# Patient Record
(demographics unavailable — no encounter records)

---

## 2024-12-31 NOTE — HISTORY OF PRESENT ILLNESS
[FreeTextEntry1] : Ms. Thompson presents to the office with reports of anorectal pain over the last 6-7 months. She admits to inconsistent BMs that requires straining at times to evacuate. There is associated burning and knifelike pain and very occasional bleeding noted.  11/16/20  returns to the office for follow-up.  Since her last office visit 2 years earlier, she reports interval improvement until recently.  She now is experiencing similar anal rectal pain with associated muscle spasms.  She believes her diet is rich enough in fiber, but she admits to inadequate daily water intake.  She has been using MiraLAX, though after missing several doses, she became somewhat constipated with then resultant anal rectal fissures and pain.  12/7/20 Ms. Thompson returns to the office for follow-up. Since her last office visit, she reports resolution of anorectal pain and discomfort after 1 week of using the prescribed hydrocortisone and diltiazem creams. In addition, she has identified the culprit of her constipation over the last 3 years, namely the medication, propanolol. Her primary care physician has since discontinued this medication and replaced it with an alternative antihypertensive. As a result, she is no longer constipated and is no longer suffering symptoms of an anal fissure. She continues to be anxious regarding the possibility of recurrent symptoms. She is scheduled for surgery the end of December but is not ready to cancel surgery.  2/16/21 Ms. Thompson returns to the office, accompanied by her , for follow-up of her anal fissure. She continues to have intermittent episodes of pain and discomfort from her anal fissure.  This time, there was noted to be blood on the toilet paper and some also on the stool.  Despite prior attempts for surgical scheduling, she has routinely canceled because of perceived clinical improvement. At this juncture, however, she is more clearly convinced of requiring surgical intervention.  She also desires to be less reliant on MiraLAX, however, admits that her issues with constipation likely stems from stress and IBS.  3/17/21 Ms. Thompson presents to the office for a POV after undergoing fissurectomy, sphincterotomy and excision of anal skin tags on 3/2/21.  She reports feeling well overall.  Bowel movements are passed daily or twice daily with some anorectal pain and discomfort, but of a different character and quality as the anal fissure pain.  She admits that the incisional discomfort is gradually improving.  There is no significant drainage or bleeding noted.  4/29/21 Ms Thompson presents to the office for follow-up.  She denies any sharp stabbing pain after bowel movements but now notes that she has difficulties with defecation.  She has been trying to adhere to a good bowel regimen, but despite this, has to increasingly strain to evacuate.  There is then throbbing of the pelvic floor for several hours afterwards that is alleviated by rest and a warm compress.  She just began taking Benefiber, is drinking ample amounts of water and continues to use MiraLAX.  11/17/21 Ms. Thompson returns to the office for follow-up.  Despite undergoing fissurectomy and sphincterotomy in March of this year, she reports occasionally still having difficulties with evacuating her stools with resultant sharp stabbing pain.  There are days when she has no difficulties with evacuating her stools.  There is no rectal bleeding noted.  She has not pursued pelvic floor physical therapy as recommended in the past.  3/16/22 Ms. Thompson returns to the office for follow-up.  It has been over a year since she underwent fissurectomy and sphincterotomy for her anal fissure.  She was last seen in office in November 2021 with what appeared to be pelvic floor spasms and a nonrelaxing puborectalis.  Recommendations were made for pelvic floor PT which she was not able to follow through on secondary to Covid infections within her family.  In addition she had been prescribed diazepam suppositories but did not receive them as pharmacy had stated the cost was not covered by her insurance.  Overall, she has been doing relatively well and recently had endometrial polyps removed and placement of an IUD.  She did not have any issues immediately postop, but over the last 2 to 3 days, has been experiencing significant pelvic pain.  She attributes this pain to difficulties evacuating her stools. As she expects there to be rectal pain immediately on evacuation, she clenches her pelvic floor in anticipation of the pain which then makes further defecation difficult.  She notes that she is on iron which may be contributing to constipation issues though continues to use MiraLAX daily which does not seem to be of any benefit.  2/2/23 Ms. Thompson returns to the office for followup.  She reports similar issues in the past including inconsistent bowel regimen with resultant straining as well as rectal pain.  This recently recurred over the last few weeks though was gradually alleviated with the help of MiraLAX, Colace as well as hemorrhoidal creams.  She is here for further evaluation and treatment options.  12/31/24 Jackeline returns to the office for followup. She reports that 1 week earlier, around the time of her menstruation, she developed severe constipation, worse constipation than her usual baseline.  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  She began taking MiraLAX and symptoms of anoectal pain have improved, but have not completely resolved.  She denies any rectal bleeding.  Here now for follow-up.

## 2024-12-31 NOTE — PHYSICAL EXAM
[Normal rectal exam] : exam was normal [Reduce Spontaneously] : a spontaneously reducible (grade II) [Normal] : was normal [None] : there was no rectal mass  [No Rash or Lesion] : No rash or lesion [Alert] : alert [Oriented to Person] : oriented to person [Oriented to Place] : oriented to place [Oriented to Time] : oriented to time [Calm] : calm [Anterior] : anteriorly [Skin Tags] : there were no residual hemorrhoidal skin tags seen [Gross Blood] : no gross blood [de-identified] : small anterior [de-identified] : No apparent distress [de-identified] : Normocephalic atraumatic [de-identified] : Moving all extremities x4

## 2024-12-31 NOTE — REASON FOR VISIT
[Follow-Up: _____] : a [unfilled] follow-up visit [FreeTextEntry1] : 3/2/21 Fissurectomy, sphincterotomy, excision skin tags

## 2024-12-31 NOTE — ASSESSMENT
[FreeTextEntry1] : Ms. Thompson presents to the office with signs/symptoms of an anal fissure. The cause of anal fissures, including hard and traumatic stools as well as diarrheal stools were discussed. Conservative management with the usage of creams, including diltiazem cream 2% t.i.d. and Analpram cream 2.5% t.i.d., to the fissure site for 4-6 weeks will be attempted in order to allow for healing without surgical intervention. In order to prevent worsening of the fissure symptoms, and/or recurrence, the following daily dietary recommendations were made : high fiber (25-30 g ), water 80 ounces, +/- MiraLax daily.  Sitz baths should also be after bowel movements and for comfort. If compliant with the above, over 80% of patients will heal with these measures . Improvement of symptoms can be seen 2 weeks after initiation of treatment. If the patient continues to have symptoms despite strict compliance to the above, a return visit will need to be scheduled. At that time, the need for sphincterotomy and fissurectomy will be reviewed and discussed.   12/7/20 Ms Thompson returns to the office for follow-up of her anal fissures. She reports that she became symptomatic 1 week after seeing me in office. In addition, she will had her antihypertensives changed so that the side effect of constipation from one of her medications could be avoided. Overall, she is improved in the sense that the constipating agent has been removed and symptomatically, she is pain-free. At this juncture, I have recommended that she cancel planned surgery at the end of the month, but she continues to be fearful of recurrent anorectal pain and discomfort. We have agreed for her to wait and see how symptoms are over the next 2 weeks before definitively canceling surgery. She was reassured that if there is future recurrence of anal fissure pain and discomfort, she could always resume using the prescribed creams. 11/16/20 Ms. Thompson returns to the office for consultation, accompanied by her .  Since her last office visit, she had improvement in an anterior anal fissure.  Recently, symptoms have returned, and on physical exam, it appears that not only does she have an anterior anal fissure, but she has since developed a posterior one.  We discussed the role of constipation and hard stools and causing these fissures.  Recommendations for a high-fiber diet with ample water intake and MiraLAX nightly were once again reviewed.  She is interested in surgical intervention and I have discussed with her the risks/benefits/alternatives for fissurectomy and sphincterotomy.  She would like to have this scheduled at [facility], but in the interim, we will renew her creams for hydrocortisone and diltiazem as well as prescribe a small dose of Valium to help break her muscle spasms.  She understands, and is agreeable.  2/16/21 Ms Thompson returns to the office with worsening issues from progression of her anal fissures.  Given this finding, I have once again recommended surgical intervention in the form of fissurectomy, sphincterotomy and excision of external hemorrhoids. The R/B/A were reviewed once again, as well as duration of procedure, postop care and pain to be expected. She would like to proceed with scheduling.  3/17/21 Ms. Thompson presents to the office for a postoperative visit.  Overall, she is doing well, and her anorectal wounds are healing appropriately.  Silver nitrate was applied to facilitate epithelialization.  She was reassured that over the following 4 to 6 weeks, the mild residual swelling will resolve itself as well the pruritus associated with a healing wound.  All questions were answered to patient satisfaction.  4/29/21 Ms. Thompson presents to the office for a POV. Since her last office visit, she is having increasing difficulties with defecation secondary to nonrelaxing pelvic floor.  As such, despite adhering to a healthy bowel regimen, she is fighting against herself during the process of defecation.  We reviewed the elements of a healthy bowel regimen and I have encouraged her to continue with Benefiber.  I strongly advised her to seek consultation with a pelvic floor physical therapist in order to relearn how to defecate without fighting against herself.  Contact information for PT was provided and I will also submit a requisition to the EMR.  11/17/21 Ms. Thompson presents to the office for follow-up.  Despite undergoing fissurectomy and sphincterotomy, she continues to have occasional difficulties relaxing her pelvic floor to evacuate her stools and then experiences recurrent anorectal pain in the form of sharp stabbing pain.  These episodes do not occur on a daily basis and when it does occur, the stools are noted to be soft and not hard.  Anorectal exam in office today demonstrated a superficial anterior anal fissure which we then proceeded to cauterize with silver nitrate after she consented.  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  Pelvic floor physical therapy would be of benefit for her to definitively treat this condition rather than return to the OR in which case I would not perform any additional sphincterotomies, but could temporize the symptoms with Botox.  The contact information for [facility] in [facility] was provided.  3/16/22 Ms. Thompson returns to the office for follow-up for pelvic pain that I suspect is secondary to nonrelaxing puborectalis as well as levator spasm.  I have strongly recommended she follow through with pelvic floor PT, and will place another requisition through the EMR for this.  Diazepam suppositories will be reordered again, and until those suppositories are available to her for usage, oral Valium will be prescribed.  To further ease the pelvic pain, ibuprofen and sitz baths should be continued.  For her bowel regimen, I have advised her to increase MiraLAX to twice daily and to ensure that she is also drinking ample amounts of water on a daily basis.  She was overall reassured that the current pain she is experiencing is not secondary to fissure pain and is therefore, not surgical.  2/2/23 Ms. JOHNSON returns to the office for follow-up.  She continues to have issues with constipation and and pelvic pain.  In the past, she was advised to undergo pelvic floor PT but has not pursued this.  I reinforced to her again the need to undergo a healthy bowel regimen with Metamucil or Benefiber in the morning, ample amounts of water throughout the day as well as MiraLAX nightly.  The standardization of this bowel regimen would then prevent her from becoming anxious regarding the type of bowel movement she would expect on a daily basis.  LOLLY was well-tolerated in office today and without any pain; anoscopy was also unremarkable.  Finally, she has never had a screening colonoscopy and I believe that given her age and ongoing issues with constipation, she should have this completed.   The risks/benefits/alternatives for a colonoscopy were discussed. These include a less than 1% risk of bleeding should any polyps be biopsied and/or removed. There is also a less than 0.1% risk of perforation. The patient understands the need to adhere to a clear liquid diet the day prior to procedure as well as having to perform a bowel prep in order to allow for adequate visualization of the mucosal surfaces.  Followup colonoscopies will be scheduled based on the findings that are seen at the time of the procedure. Patient understands and is agreeable, and will proceed with consent and scheduling.  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 In office today, a superficial anal fissure was noted anteriorly. Advised diltiazem and hydrocortisone cream to the site with prescriptions ordered. For her pelvic floor spasms, advised tylenol and warm sitz baths. Diazepam suppositories will be prescribed to alleviate spasms. To avoid constipation, advised regular usage of miralax whether half a capful or one capful daily. All questions addressed.

## 2025-04-29 NOTE — PHYSICAL EXAM
[Normal rectal exam] : exam was normal [Reduce Spontaneously] : a spontaneously reducible (grade II) [Skin Tags] : there were no residual hemorrhoidal skin tags seen [Normal] : was normal [None] : there was no rectal mass  [Gross Blood] : no gross blood [No Rash or Lesion] : No rash or lesion [Alert] : alert [Oriented to Person] : oriented to person [Oriented to Place] : oriented to place [Oriented to Time] : oriented to time [Calm] : calm [de-identified] : small anterior [de-identified] : No apparent distress [de-identified] : Normocephalic atraumatic [de-identified] : Moving all extremities x4

## 2025-04-29 NOTE — ASSESSMENT
[FreeTextEntry1] : Ms. Thompson presents to the office with signs/symptoms of an anal fissure. The cause of anal fissures, including hard and traumatic stools as well as diarrheal stools were discussed. Conservative management with the usage of creams, including diltiazem cream 2% t.i.d. and Analpram cream 2.5% t.i.d., to the fissure site for 4-6 weeks will be attempted in order to allow for healing without surgical intervention. In order to prevent worsening of the fissure symptoms, and/or recurrence, the following daily dietary recommendations were made : high fiber (25-30 g ), water 80 ounces, +/- MiraLax daily.  Sitz baths should also be after bowel movements and for comfort. If compliant with the above, over 80% of patients will heal with these measures . Improvement of symptoms can be seen 2 weeks after initiation of treatment. If the patient continues to have symptoms despite strict compliance to the above, a return visit will need to be scheduled. At that time, the need for sphincterotomy and fissurectomy will be reviewed and discussed.   12/7/20 Ms Thompson returns to the office for follow-up of her anal fissures. She reports that she became symptomatic 1 week after seeing me in office. In addition, she will had her antihypertensives changed so that the side effect of constipation from one of her medications could be avoided. Overall, she is improved in the sense that the constipating agent has been removed and symptomatically, she is pain-free. At this juncture, I have recommended that she cancel planned surgery at the end of the month, but she continues to be fearful of recurrent anorectal pain and discomfort. We have agreed for her to wait and see how symptoms are over the next 2 weeks before definitively canceling surgery. She was reassured that if there is future recurrence of anal fissure pain and discomfort, she could always resume using the prescribed creams. 11/16/20 Ms. Thompson returns to the office for consultation, accompanied by her .  Since her last office visit, she had improvement in an anterior anal fissure.  Recently, symptoms have returned, and on physical exam, it appears that not only does she have an anterior anal fissure, but she has since developed a posterior one.  We discussed the role of constipation and hard stools and causing these fissures.  Recommendations for a high-fiber diet with ample water intake and MiraLAX nightly were once again reviewed.  She is interested in surgical intervention and I have discussed with her the risks/benefits/alternatives for fissurectomy and sphincterotomy.  She would like to have this scheduled at [facility], but in the interim, we will renew her creams for hydrocortisone and diltiazem as well as prescribe a small dose of Valium to help break her muscle spasms.  She understands, and is agreeable.  2/16/21 Ms Thompson returns to the office with worsening issues from progression of her anal fissures.  Given this finding, I have once again recommended surgical intervention in the form of fissurectomy, sphincterotomy and excision of external hemorrhoids. The R/B/A were reviewed once again, as well as duration of procedure, postop care and pain to be expected. She would like to proceed with scheduling.  3/17/21 Ms. Thompson presents to the office for a postoperative visit.  Overall, she is doing well, and her anorectal wounds are healing appropriately.  Silver nitrate was applied to facilitate epithelialization.  She was reassured that over the following 4 to 6 weeks, the mild residual swelling will resolve itself as well the pruritus associated with a healing wound.  All questions were answered to patient satisfaction.  4/29/21 Ms. Thompson presents to the office for a POV. Since her last office visit, she is having increasing difficulties with defecation secondary to nonrelaxing pelvic floor.  As such, despite adhering to a healthy bowel regimen, she is fighting against herself during the process of defecation.  We reviewed the elements of a healthy bowel regimen and I have encouraged her to continue with Benefiber.  I strongly advised her to seek consultation with a pelvic floor physical therapist in order to relearn how to defecate without fighting against herself.  Contact information for PT was provided and I will also submit a requisition to the EMR.  11/17/21 Ms. Thompson presents to the office for follow-up.  Despite undergoing fissurectomy and sphincterotomy, she continues to have occasional difficulties relaxing her pelvic floor to evacuate her stools and then experiences recurrent anorectal pain in the form of sharp stabbing pain.  These episodes do not occur on a daily basis and when it does occur, the stools are noted to be soft and not hard.  Anorectal exam in office today demonstrated a superficial anterior anal fissure which we then proceeded to cauterize with silver nitrate after she consented.  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  Pelvic floor physical therapy would be of benefit for her to definitively treat this condition rather than return to the OR in which case I would not perform any additional sphincterotomies, but could temporize the symptoms with Botox.  The contact information for [facility] in [facility] was provided.  3/16/22 Ms. Thompson returns to the office for follow-up for pelvic pain that I suspect is secondary to nonrelaxing puborectalis as well as levator spasm.  I have strongly recommended she follow through with pelvic floor PT, and will place another requisition through the EMR for this.  Diazepam suppositories will be reordered again, and until those suppositories are available to her for usage, oral Valium will be prescribed.  To further ease the pelvic pain, ibuprofen and sitz baths should be continued.  For her bowel regimen, I have advised her to increase MiraLAX to twice daily and to ensure that she is also drinking ample amounts of water on a daily basis.  She was overall reassured that the current pain she is experiencing is not secondary to fissure pain and is therefore, not surgical.  2/2/23 Ms. JOHNSON returns to the office for follow-up.  She continues to have issues with constipation and and pelvic pain.  In the past, she was advised to undergo pelvic floor PT but has not pursued this.  I reinforced to her again the need to undergo a healthy bowel regimen with Metamucil or Benefiber in the morning, ample amounts of water throughout the day as well as MiraLAX nightly.  The standardization of this bowel regimen would then prevent her from becoming anxious regarding the type of bowel movement she would expect on a daily basis.  LOLLY was well-tolerated in office today and without any pain; anoscopy was also unremarkable.  Finally, she has never had a screening colonoscopy and I believe that given her age and ongoing issues with constipation, she should have this completed.   The risks/benefits/alternatives for a colonoscopy were discussed. These include a less than 1% risk of bleeding should any polyps be biopsied and/or removed. There is also a less than 0.1% risk of perforation. The patient understands the need to adhere to a clear liquid diet the day prior to procedure as well as having to perform a bowel prep in order to allow for adequate visualization of the mucosal surfaces.  Followup colonoscopies will be scheduled based on the findings that are seen at the time of the procedure. Patient understands and is agreeable, and will proceed with consent and scheduling.  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 In office today, a superficial anal fissure was noted anteriorly. Advised diltiazem and hydrocortisone cream to the site with prescriptions ordered. For her pelvic floor spasms, advised tylenol and warm sitz baths. Diazepam suppositories will be prescribed to alleviate spasms. To avoid constipation, advised regular usage of miralax whether half a capful or one capful daily. All questions addressed.  4/29/25 Ms Thompson returns to the office for followup. Since her last office appt, she reports continued intermittent issues with anorectal pain and spasms. Anorectal exam in office today did not reveal e/o recurrent anal fissures. Advised options of pelvic floor PT and/or chemodenervation of IS muscles. She is interested in the latter option for which R/B/A were reviewed. She will notify office if interested in scheduling.
